# Patient Record
Sex: MALE | URBAN - METROPOLITAN AREA
[De-identification: names, ages, dates, MRNs, and addresses within clinical notes are randomized per-mention and may not be internally consistent; named-entity substitution may affect disease eponyms.]

---

## 2021-11-09 ENCOUNTER — OFFICE VISIT (OUTPATIENT)
Dept: URBAN - METROPOLITAN AREA CLINIC 91 | Facility: CLINIC | Age: 76
End: 2021-11-09
Payer: MEDICARE

## 2021-11-09 DIAGNOSIS — H20.011 PRIMARY IRIDOCYCLITIS, RIGHT EYE: Primary | ICD-10-CM

## 2021-11-09 DIAGNOSIS — H25.11 AGE-RELATED NUCLEAR CATARACT, RIGHT EYE: ICD-10-CM

## 2021-11-09 DIAGNOSIS — H35.363 DRUSEN (DEGENERATIVE) OF MACULA, BILATERAL: ICD-10-CM

## 2021-11-09 PROCEDURE — 99204 OFFICE O/P NEW MOD 45 MIN: CPT | Performed by: SPECIALIST

## 2021-11-09 PROCEDURE — 92134 CPTRZ OPH DX IMG PST SGM RTA: CPT | Performed by: SPECIALIST

## 2021-11-09 RX ORDER — PREDNISOLONE ACETATE 10 MG/ML
1 % SUSPENSION/ DROPS OPHTHALMIC
Qty: 10 | Refills: 1 | Status: ACTIVE
Start: 2021-11-09

## 2021-11-09 ASSESSMENT — INTRAOCULAR PRESSURE
OD: 15
OS: 14

## 2021-11-09 ASSESSMENT — VISUAL ACUITY
OS: 20/25
OD: 20/30

## 2021-11-09 NOTE — IMPRESSION/PLAN
Impression: Drusen (degenerative) of macula, bilateral: H35.363. Plan: For drusen I have recommended patient to call our office immediately if change is noted. I also explained the AREDS vitamin study, and advised patient that it would be beneficial for them to take. I stressed the importance of compliance and regular follow-up appointments. OCTm EVELYN.

## 2021-11-09 NOTE — IMPRESSION/PLAN
Impression: Primary iridocyclitis, right eye: H20.011. Plan: For iritis right eye, recommend to start PF gtts QID OD and taper down with in a month 4-3-2-1 then d/c.

## 2021-11-09 NOTE — IMPRESSION/PLAN
Impression: Age-related nuclear cataract, right eye: H25.11. Plan: Due to the fact that cataract is not affecting patient's vision in right eye, I would not recommend surgical intervention at this time. Patient was advised to consider using UVB sunglasses when outdoors. We will consider cataract surgery at later time if patient's visual function no longer meets their needs or interferes with their daily activities. Will recheck in 6-8 weeks.

## 2022-01-10 ENCOUNTER — OFFICE VISIT (OUTPATIENT)
Dept: URBAN - METROPOLITAN AREA CLINIC 91 | Facility: CLINIC | Age: 77
End: 2022-01-10
Payer: MEDICARE

## 2022-01-10 PROCEDURE — 92134 CPTRZ OPH DX IMG PST SGM RTA: CPT | Performed by: SPECIALIST

## 2022-01-10 PROCEDURE — 99214 OFFICE O/P EST MOD 30 MIN: CPT | Performed by: SPECIALIST

## 2022-01-10 ASSESSMENT — VISUAL ACUITY
OD: 20/40
OS: 20/25

## 2022-01-10 ASSESSMENT — INTRAOCULAR PRESSURE
OS: 13
OD: 10

## 2022-01-10 NOTE — IMPRESSION/PLAN
Impression: Age-related nuclear cataract, right eye: H25.11. Plan: Due to the fact that cataracts are interfering with patient's daily activities, cataract surgery was discussed as an option to improve patient's vision. I have discussed all risks and benefits associated with surgery. Patient understands that the need for cataract surgery is NOT urgent, and that surgery may be delayed if they wish. I discussed the different intra-ocular lens options available including standard monofocal IOL, Crystalens, Toric, ReSTOR Multifocal and Nanoflex blended vision. I have explained the option of patient proceeding with standard cataract surgery vs LenSx and astigmatism management. I stressed possible side effects such as halos and glare, need for glasses, contacts and further surgery such as laser vision correction or IOL exchange. I answered all patient's questions, and addressed any concerns patient may have. Patient wishes to schedule appointment for pre-operative exam at this time. 

Patient would like to proceed with CE IOL OD, good candidate for LenSx. same as OS basic lens

## 2022-01-20 ENCOUNTER — PRE-OPERATIVE VISIT (OUTPATIENT)
Dept: URBAN - METROPOLITAN AREA CLINIC 91 | Facility: CLINIC | Age: 77
End: 2022-01-20
Payer: MEDICARE

## 2022-01-20 RX ORDER — PREDNISOLONE ACETATE 10 MG/ML
1 % SUSPENSION/ DROPS OPHTHALMIC
Qty: 5 | Refills: 3 | Status: ACTIVE
Start: 2022-01-20

## 2022-01-20 RX ORDER — KETOROLAC TROMETHAMINE 5 MG/ML
0.5 % SOLUTION OPHTHALMIC
Qty: 5 | Refills: 3 | Status: ACTIVE
Start: 2022-01-20

## 2022-01-20 RX ORDER — OFLOXACIN 3 MG/ML
0.3 % SOLUTION/ DROPS OPHTHALMIC
Qty: 5 | Refills: 3 | Status: ACTIVE
Start: 2022-01-20

## 2022-01-20 ASSESSMENT — PACHYMETRY
OS: 23.28
OS: 4.62
OD: 23.42
OD: 2.20

## 2022-02-16 ENCOUNTER — SURGERY (OUTPATIENT)
Dept: URBAN - METROPOLITAN AREA EXTERNAL CLINIC 49 | Facility: EXTERNAL CLINIC | Age: 77
End: 2022-02-16
Payer: MEDICARE

## 2022-02-16 PROCEDURE — PR2CC PR2CC: CUSTOM | Performed by: SPECIALIST

## 2022-02-16 PROCEDURE — 66982 XCAPSL CTRC RMVL CPLX WO ECP: CPT | Performed by: SPECIALIST

## 2022-02-17 ENCOUNTER — POST-OPERATIVE VISIT (OUTPATIENT)
Dept: URBAN - METROPOLITAN AREA CLINIC 91 | Facility: CLINIC | Age: 77
End: 2022-02-17
Payer: MEDICARE

## 2022-02-17 ASSESSMENT — INTRAOCULAR PRESSURE: OD: 16

## 2022-02-17 NOTE — IMPRESSION/PLAN
Impression: S/P CE IOL LenSx OD - 1 Day. Encounter for surgical aftercare following surgery on a sense organ  Z48.810. Post operative instructions reviewed - Plan: Explained to patient that he had zonular dehiscense and we had to use capsular tension ring. Possible need for prolong healing explained. Patient is healing well s/p CE IOL. Patient instructed to avoid lifting over 10 pounds and avoid swimming for 2 weeks. Instructed to continue antibiotic, NSAID and steroidal drops QID. Patient will return in 1 week for post op exam. Instructed to call our office if any pain, decreased vision or increased redness.  keto-oflox-pred OD QID

## 2022-02-24 ENCOUNTER — POST-OPERATIVE VISIT (OUTPATIENT)
Dept: URBAN - METROPOLITAN AREA CLINIC 91 | Facility: CLINIC | Age: 77
End: 2022-02-24
Payer: MEDICARE

## 2022-02-24 DIAGNOSIS — Z48.810 ENCOUNTER FOR SURGICAL AFTERCARE FOLLOWING SURGERY ON A SENSE ORGAN: Primary | ICD-10-CM

## 2022-02-24 PROCEDURE — 99024 POSTOP FOLLOW-UP VISIT: CPT | Performed by: OPHTHALMOLOGY

## 2022-02-24 ASSESSMENT — INTRAOCULAR PRESSURE
OD: 12
OS: 13

## 2022-02-24 NOTE — IMPRESSION/PLAN
Impression: S/P CE IOL LenSx OD - 8 Days. Encounter for surgical aftercare following surgery on a sense organ  Z48.810. Post operative instructions reviewed - Plan: Patient is healing well status post CE IOL.  Patient will discontinue post operative drops unless directed by physician and follow up as scheduled pred OD BID

## 2022-03-17 ENCOUNTER — POST-OPERATIVE VISIT (OUTPATIENT)
Dept: URBAN - METROPOLITAN AREA CLINIC 91 | Facility: CLINIC | Age: 77
End: 2022-03-17
Payer: MEDICARE

## 2022-03-17 ASSESSMENT — VISUAL ACUITY
OD: 20/25
OS: 20/25

## 2022-03-17 ASSESSMENT — INTRAOCULAR PRESSURE
OS: 14
OD: 13

## 2022-03-17 NOTE — IMPRESSION/PLAN
Impression: S/P CE IOL LenSx OD - 29 Days. Encounter for surgical aftercare following surgery on a sense organ  Z48.810. Post operative instructions reviewed - Plan: Patient is healing well status post CE IOL. Patient will discontinue post operative drops unless directed by physician and follow up as scheduled.   Patient prefers to get glasses at this time and will think about Tobias 86 options to correct residual refractive error

## 2022-05-03 ENCOUNTER — OFFICE VISIT (OUTPATIENT)
Dept: URBAN - METROPOLITAN AREA CLINIC 91 | Facility: CLINIC | Age: 77
End: 2022-05-03
Payer: MEDICARE

## 2022-05-03 DIAGNOSIS — H43.391 OTHER VITREOUS OPACITIES, RIGHT EYE: ICD-10-CM

## 2022-05-03 DIAGNOSIS — H35.363 DRUSEN (DEGENERATIVE) OF MACULA, BILATERAL: Primary | ICD-10-CM

## 2022-05-03 PROCEDURE — 99213 OFFICE O/P EST LOW 20 MIN: CPT | Performed by: SPECIALIST

## 2022-05-03 ASSESSMENT — INTRAOCULAR PRESSURE
OD: 15
OS: 13

## 2022-05-03 ASSESSMENT — VISUAL ACUITY
OD: 20/25
OS: 20/25

## 2022-05-03 NOTE — IMPRESSION/PLAN
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 Plan: For Vitreous floaters OD, I have explained the potential for developing a retinal tear or detachment. Patient was advised to return ASAP is they notice an increase or change in floaters, a change or decrease or vision or a curtain/veil coming over vision. I have explained the risk of vision loss and possible need for retinal detachment repair in detail.

## 2023-08-14 ENCOUNTER — OFFICE VISIT (OUTPATIENT)
Dept: URBAN - METROPOLITAN AREA CLINIC 91 | Facility: CLINIC | Age: 78
End: 2023-08-14
Payer: MEDICARE

## 2023-08-14 DIAGNOSIS — H26.493 OTHER SECONDARY CATARACT, BILATERAL: ICD-10-CM

## 2023-08-14 DIAGNOSIS — H43.391 OTHER VITREOUS OPACITIES, RIGHT EYE: ICD-10-CM

## 2023-08-14 DIAGNOSIS — H52.223 REGULAR ASTIGMATISM, BILATERAL: ICD-10-CM

## 2023-08-14 DIAGNOSIS — H35.363 DRUSEN (DEGENERATIVE) OF MACULA, BILATERAL: Primary | ICD-10-CM

## 2023-08-14 PROCEDURE — 92015 DETERMINE REFRACTIVE STATE: CPT | Performed by: SPECIALIST

## 2023-08-14 PROCEDURE — 92134 CPTRZ OPH DX IMG PST SGM RTA: CPT | Performed by: SPECIALIST

## 2023-08-14 PROCEDURE — 99214 OFFICE O/P EST MOD 30 MIN: CPT | Performed by: SPECIALIST

## 2023-08-14 ASSESSMENT — INTRAOCULAR PRESSURE
OD: 14
OS: 12

## 2024-09-30 ENCOUNTER — OFFICE VISIT (OUTPATIENT)
Dept: URBAN - METROPOLITAN AREA CLINIC 91 | Facility: CLINIC | Age: 79
End: 2024-09-30
Payer: MEDICARE

## 2024-09-30 DIAGNOSIS — H35.363 DRUSEN (DEGENERATIVE) OF MACULA, BILATERAL: ICD-10-CM

## 2024-09-30 DIAGNOSIS — H43.89 OTHER DISORDERS OF VITREOUS BODY: Primary | ICD-10-CM

## 2024-09-30 DIAGNOSIS — H26.493 OTHER SECONDARY CATARACT, BILATERAL: ICD-10-CM

## 2024-09-30 PROCEDURE — 99214 OFFICE O/P EST MOD 30 MIN: CPT | Performed by: SPECIALIST

## 2024-09-30 ASSESSMENT — VISUAL ACUITY
OD: 20/20
OS: 20/20

## 2024-09-30 ASSESSMENT — INTRAOCULAR PRESSURE
OD: 14
OS: 12